# Patient Record
Sex: MALE | Race: BLACK OR AFRICAN AMERICAN
[De-identification: names, ages, dates, MRNs, and addresses within clinical notes are randomized per-mention and may not be internally consistent; named-entity substitution may affect disease eponyms.]

---

## 2023-04-03 PROBLEM — Z00.00 ENCOUNTER FOR PREVENTIVE HEALTH EXAMINATION: Status: ACTIVE | Noted: 2023-04-03

## 2023-04-05 ENCOUNTER — APPOINTMENT (OUTPATIENT)
Dept: UROLOGY | Facility: CLINIC | Age: 28
End: 2023-04-05
Payer: COMMERCIAL

## 2023-04-05 VITALS
WEIGHT: 187 LBS | TEMPERATURE: 98.6 F | HEART RATE: 67 BPM | SYSTOLIC BLOOD PRESSURE: 126 MMHG | DIASTOLIC BLOOD PRESSURE: 82 MMHG | RESPIRATION RATE: 16 BRPM | OXYGEN SATURATION: 98 % | HEIGHT: 72 IN | BODY MASS INDEX: 25.33 KG/M2

## 2023-04-05 DIAGNOSIS — N52.9 MALE ERECTILE DYSFUNCTION, UNSPECIFIED: ICD-10-CM

## 2023-04-05 PROCEDURE — 99072 ADDL SUPL MATRL&STAF TM PHE: CPT

## 2023-04-05 PROCEDURE — 99204 OFFICE O/P NEW MOD 45 MIN: CPT

## 2023-04-07 NOTE — HISTORY OF PRESENT ILLNESS
[FreeTextEntry1] : Language: English\par Date of First visit: 04/05/2023 \par Accompanied by: self\par Contact info: \par Referring Provider/PCP: Dr. Zhou Townsend\par Fax: \par \par CC/ Problem List:\par ED\par testosterone check\par ===============================================================================\par FIRST VISIT / Summary:\par Very pleasant 27 year old M here for impotence/ED after taking finasteride for hair loss. Now on finasteride/minoxidil spray and having similar effects despite lower dose. Here to discuss options. He wanted to know if it lowers total testosterone if then taking T supplementation might help. \par \par -------------------------------------------------------------------------------------------\par INTERVAL VISITS:\par \par ===============================================================================\par \par PMH: none\par Meds: latanoprost eye drops, timolol eye drops\par All: ibuprofen (neck swelling), bees, pollen\par FHx: no family hx of CAD, MI, grandmother with stroke in old age\par SocHx: smoker socially only, etoh same, \par \par PSH: none\par \par ROS: Review of Systems is as per HPI unless otherwise denoted below\par \par \par ===============================================================================\par DATA: \par \par LABS (SELECTED):---------------------------------------------------------------------------------------------------\par \par \par RADS:-------------------------------------------------------------------------------------------------------------------\par \par \par PATHOLOGY/CYTOLOGY:-------------------------------------------------------------------------------------------\par \par \par VOIDING STUDIES: ----------------------------------------------------------------------------------------------------\par \par \par STONE STUDIES: (Analysis/LLSA)----------------------------------------------------------------------------------\par \par \par PROCEDURES: -----------------------------------------------------------------------------------------------\par \par \par \par \par ===============================================================================\par \par PHYSICAL EXAM:\par \par GEN: AAOx3, NAD\par \par PSYCH: Appropriate Behavior, Affect Congruent\par \par Lungs: No labored breathing\par \par GAIT: Gait normal, Stability good\par \par ABD: no suprapubic or CVAT\par \par \par  FOCUSED: --------------(done xx/xx/xxxx)------------------------------------------------------------------------\par \par \par =======================================================================================\par DISCUSSION: \par We discussed the constellation of symptoms and the option of discontinuing finasteride. In some men this may resolve the symptoms. In others symptoms may persist (and this could explain his symptoms not improving on lower dose). However supplementation of testosterone would add side effects without any data to support improvement in symptoms given 5ARI use does not decrease total T only DHT levels. We therefore can treat his symptoms independently in the same manner we would regardless of finasteride use.\par =======================================================================================\par ASSESSMENT and PLAN\par \par \par 1. ED\par - check am labs\par - can trial PDE5i for now he does not want script\par - he will get labs [facility]\par - follow-up as needed, will call him for abnormal results\par \par \par =======================================================================================\par \par Thank you for allowing me to assist in the care of your patient. Should you have any questions please do not hesitate to reach out to me.\par \par \par Abdi Mcnair MD\par [facility]\par [facility] [place]\par [address], [address]\[place] [address]\par T: [phone]\par F: [phone]